# Patient Record
Sex: FEMALE | Race: WHITE | NOT HISPANIC OR LATINO | Employment: FULL TIME | URBAN - METROPOLITAN AREA
[De-identification: names, ages, dates, MRNs, and addresses within clinical notes are randomized per-mention and may not be internally consistent; named-entity substitution may affect disease eponyms.]

---

## 2023-05-21 DIAGNOSIS — Z00.00 ROUTINE GENERAL MEDICAL EXAMINATION AT A HEALTH CARE FACILITY: Primary | ICD-10-CM

## 2023-06-06 ENCOUNTER — LAB (OUTPATIENT)
Dept: LAB | Facility: LAB | Age: 58
End: 2023-06-06
Payer: COMMERCIAL

## 2023-06-06 DIAGNOSIS — Z00.00 ROUTINE GENERAL MEDICAL EXAMINATION AT A HEALTH CARE FACILITY: ICD-10-CM

## 2023-06-06 LAB
ALANINE AMINOTRANSFERASE (SGPT) (U/L) IN SER/PLAS: 19 U/L (ref 7–45)
ANION GAP IN SER/PLAS: 11 MMOL/L (ref 10–20)
CALCIDIOL (25 OH VITAMIN D3) (NG/ML) IN SER/PLAS: 31 NG/ML
CALCIUM (MG/DL) IN SER/PLAS: 9.9 MG/DL (ref 8.6–10.3)
CARBON DIOXIDE, TOTAL (MMOL/L) IN SER/PLAS: 31 MMOL/L (ref 21–32)
CHLORIDE (MMOL/L) IN SER/PLAS: 103 MMOL/L (ref 98–107)
CHOLESTEROL (MG/DL) IN SER/PLAS: 176 MG/DL (ref 0–199)
CHOLESTEROL IN HDL (MG/DL) IN SER/PLAS: 60.1 MG/DL
CHOLESTEROL/HDL RATIO: 2.9
COBALAMIN (VITAMIN B12) (PG/ML) IN SER/PLAS: 241 PG/ML (ref 211–911)
CREATININE (MG/DL) IN SER/PLAS: 0.93 MG/DL (ref 0.5–1.05)
ERYTHROCYTE DISTRIBUTION WIDTH (RATIO) BY AUTOMATED COUNT: 12.6 % (ref 11.5–14.5)
ERYTHROCYTE MEAN CORPUSCULAR HEMOGLOBIN CONCENTRATION (G/DL) BY AUTOMATED: 31.4 G/DL (ref 32–36)
ERYTHROCYTE MEAN CORPUSCULAR VOLUME (FL) BY AUTOMATED COUNT: 91 FL (ref 80–100)
ERYTHROCYTES (10*6/UL) IN BLOOD BY AUTOMATED COUNT: 4.46 X10E12/L (ref 4–5.2)
GFR FEMALE: 72 ML/MIN/1.73M2
GLUCOSE (MG/DL) IN SER/PLAS: 90 MG/DL (ref 74–99)
HEMATOCRIT (%) IN BLOOD BY AUTOMATED COUNT: 40.5 % (ref 36–46)
HEMOGLOBIN (G/DL) IN BLOOD: 12.7 G/DL (ref 12–16)
LDL: 88 MG/DL (ref 0–99)
LEUKOCYTES (10*3/UL) IN BLOOD BY AUTOMATED COUNT: 5.8 X10E9/L (ref 4.4–11.3)
PLATELETS (10*3/UL) IN BLOOD AUTOMATED COUNT: 222 X10E9/L (ref 150–450)
POTASSIUM (MMOL/L) IN SER/PLAS: 4.5 MMOL/L (ref 3.5–5.3)
SODIUM (MMOL/L) IN SER/PLAS: 140 MMOL/L (ref 136–145)
TRIGLYCERIDE (MG/DL) IN SER/PLAS: 140 MG/DL (ref 0–149)
UREA NITROGEN (MG/DL) IN SER/PLAS: 34 MG/DL (ref 6–23)
VLDL: 28 MG/DL (ref 0–40)

## 2023-06-06 PROCEDURE — 80061 LIPID PANEL: CPT

## 2023-06-06 PROCEDURE — 85027 COMPLETE CBC AUTOMATED: CPT

## 2023-06-06 PROCEDURE — 36415 COLL VENOUS BLD VENIPUNCTURE: CPT

## 2023-06-06 PROCEDURE — 82607 VITAMIN B-12: CPT

## 2023-06-06 PROCEDURE — 82306 VITAMIN D 25 HYDROXY: CPT

## 2023-06-06 PROCEDURE — 84460 ALANINE AMINO (ALT) (SGPT): CPT

## 2023-06-06 PROCEDURE — 80048 BASIC METABOLIC PNL TOTAL CA: CPT

## 2023-06-28 DIAGNOSIS — F41.9 ANXIETY: Primary | ICD-10-CM

## 2023-06-29 ENCOUNTER — OFFICE VISIT (OUTPATIENT)
Dept: PRIMARY CARE | Facility: CLINIC | Age: 58
End: 2023-06-29
Payer: COMMERCIAL

## 2023-06-29 VITALS
BODY MASS INDEX: 18.08 KG/M2 | WEIGHT: 115.2 LBS | DIASTOLIC BLOOD PRESSURE: 78 MMHG | SYSTOLIC BLOOD PRESSURE: 114 MMHG | HEIGHT: 67 IN

## 2023-06-29 DIAGNOSIS — G47.30 SLEEP APNEA, UNSPECIFIED TYPE: ICD-10-CM

## 2023-06-29 DIAGNOSIS — R53.83 OTHER FATIGUE: ICD-10-CM

## 2023-06-29 DIAGNOSIS — Z00.00 ROUTINE PHYSICAL EXAMINATION: Primary | ICD-10-CM

## 2023-06-29 DIAGNOSIS — E53.8 B12 DEFICIENCY: ICD-10-CM

## 2023-06-29 DIAGNOSIS — E55.9 VITAMIN D DEFICIENCY: ICD-10-CM

## 2023-06-29 DIAGNOSIS — D69.6 THROMBOCYTOPENIA (CMS-HCC): ICD-10-CM

## 2023-06-29 PROBLEM — F41.9 ANXIETY: Status: ACTIVE | Noted: 2023-06-29

## 2023-06-29 PROBLEM — N20.0 NEPHROLITHIASIS: Status: ACTIVE | Noted: 2023-06-29

## 2023-06-29 PROCEDURE — 1036F TOBACCO NON-USER: CPT | Performed by: INTERNAL MEDICINE

## 2023-06-29 PROCEDURE — 99396 PREV VISIT EST AGE 40-64: CPT | Performed by: INTERNAL MEDICINE

## 2023-06-29 RX ORDER — ACETAMINOPHEN 500 MG
TABLET ORAL DAILY
COMMUNITY

## 2023-06-29 RX ORDER — DULOXETIN HYDROCHLORIDE 30 MG/1
CAPSULE, DELAYED RELEASE ORAL
Qty: 270 CAPSULE | Refills: 3 | Status: SHIPPED | OUTPATIENT
Start: 2023-06-29 | End: 2023-08-30 | Stop reason: DRUGHIGH

## 2023-06-29 RX ORDER — CALCIUM CARBONATE/VITAMIN D3 500-10/5ML
400 LIQUID (ML) ORAL
COMMUNITY

## 2023-06-29 RX ORDER — DULOXETIN HYDROCHLORIDE 30 MG/1
3 CAPSULE, DELAYED RELEASE ORAL DAILY
COMMUNITY
Start: 2019-10-10 | End: 2023-12-05 | Stop reason: DRUGHIGH

## 2023-06-29 RX ORDER — SUMATRIPTAN SUCCINATE 100 MG/1
100 TABLET ORAL ONCE AS NEEDED
COMMUNITY
End: 2023-08-30 | Stop reason: SDUPTHER

## 2023-06-29 ASSESSMENT — PATIENT HEALTH QUESTIONNAIRE - PHQ9
1. LITTLE INTEREST OR PLEASURE IN DOING THINGS: SEVERAL DAYS
SUM OF ALL RESPONSES TO PHQ9 QUESTIONS 1 AND 2: 2
2. FEELING DOWN, DEPRESSED OR HOPELESS: SEVERAL DAYS

## 2023-06-29 NOTE — PROGRESS NOTES
"SUBJECTIVE:   Namrata Myers is a 58 y.o. female presenting for her annual checkup.  +exercise  Diet \"pretty good\"    Current Outpatient Medications   Medication Sig Dispense Refill    cetirizine (ZyrTEC) 10 mg capsule Take by mouth once daily.      cholecalciferol (Vitamin D-3) 50 mcg (2,000 unit) capsule Take by mouth early in the morning..      DULoxetine (Cymbalta) 30 mg DR capsule Take 3 capsules (90 mg) by mouth once daily.      magnesium oxide 400 mg magnesium capsule Take 1 capsule (400 mg) by mouth once daily.      SUMAtriptan (Imitrex) 100 mg tablet Take 1 tablet (100 mg) by mouth 1 time if needed.       No current facility-administered medications for this visit.     Allergies: Codeine and Pollen extracts     ROS:  Feeling well. No dyspnea or chest pain on exertion. No abdominal pain, change in bowel habits, black or bloody stools. No urinary tract or prostatic symptoms. No neurological complaints.    OBJECTIVE:   The patient appears well, alert, oriented x 3, in no distress.   /78   Ht 1.689 m (5' 6.5\")   Wt 52.3 kg (115 lb 3.2 oz)   BMI 18.32 kg/m²   ENT normal.  Neck supple. No adenopathy or thyromegaly. LUIZ. Lungs are clear, good air entry, no wheezes, rhonchi or rales. S1 and S2 normal, no murmurs, regular rate and rhythm. Abdomen is soft without tenderness, guarding, mass or organomegaly.  exam:  Extremities show no edema, normal peripheral pulses. Neurological is normal without focal findings.    Lab Results   Component Value Date    WBC 5.8 06/06/2023    HGB 12.7 06/06/2023    HCT 40.5 06/06/2023     06/06/2023    CHOL 176 06/06/2023    TRIG 140 06/06/2023    HDL 60.1 06/06/2023    ALT 19 06/06/2023    AST 23 05/28/2019     06/06/2023    K 4.5 06/06/2023     06/06/2023    CREATININE 0.93 06/06/2023    BUN 34 (H) 06/06/2023    CO2 31 06/06/2023    TSH 2.47 08/06/2021       ASSESSMENT:   healthy adult male    PLAN:   return for routine annual checkups     #1 " anxiety/depression- reviewed.  Continue treatment.  #2 fatigue-low-grade.  We will start with basic labs as well as sleep study given significant snoring.  Further evaluation pending  #3 g84-tgenyi B12, low end of normal.  We will begin 1000 mcg OTC daily.  #4 vit D-Labs  #5 thrombocytopenia-clinically stable.  Recheck CBC every 6 months.  Status post heme evaluation  #6 kidney stones/hematuria- good.  f/u  w/     Suspect RAMEZ-snoring, increased fatigue.  Occasional morning headaches.  We will check sleep study.    f/u  2025

## 2023-08-26 ENCOUNTER — LAB (OUTPATIENT)
Dept: LAB | Facility: LAB | Age: 58
End: 2023-08-26
Payer: COMMERCIAL

## 2023-08-26 DIAGNOSIS — D69.6 THROMBOCYTOPENIA (CMS-HCC): ICD-10-CM

## 2023-08-26 DIAGNOSIS — R53.83 OTHER FATIGUE: ICD-10-CM

## 2023-08-26 DIAGNOSIS — E53.8 B12 DEFICIENCY: ICD-10-CM

## 2023-08-26 LAB
ANION GAP IN SER/PLAS: 11 MMOL/L (ref 10–20)
APPEARANCE, URINE: ABNORMAL
BILIRUBIN, URINE: NEGATIVE
BLOOD, URINE: ABNORMAL
CALCIUM (MG/DL) IN SER/PLAS: 10 MG/DL (ref 8.6–10.6)
CALCIUM OXALATE CRYSTALS, URINE: ABNORMAL /HPF
CARBON DIOXIDE, TOTAL (MMOL/L) IN SER/PLAS: 31 MMOL/L (ref 21–32)
CHLORIDE (MMOL/L) IN SER/PLAS: 104 MMOL/L (ref 98–107)
COBALAMIN (VITAMIN B12) (PG/ML) IN SER/PLAS: 751 PG/ML (ref 211–911)
COLOR, URINE: YELLOW
CREATININE (MG/DL) IN SER/PLAS: 0.81 MG/DL (ref 0.5–1.05)
ERYTHROCYTE DISTRIBUTION WIDTH (RATIO) BY AUTOMATED COUNT: 12.3 % (ref 11.5–14.5)
ERYTHROCYTE MEAN CORPUSCULAR HEMOGLOBIN CONCENTRATION (G/DL) BY AUTOMATED: 31.2 G/DL (ref 32–36)
ERYTHROCYTE MEAN CORPUSCULAR VOLUME (FL) BY AUTOMATED COUNT: 93 FL (ref 80–100)
ERYTHROCYTES (10*6/UL) IN BLOOD BY AUTOMATED COUNT: 4.44 X10E12/L (ref 4–5.2)
GFR FEMALE: 85 ML/MIN/1.73M2
GLUCOSE (MG/DL) IN SER/PLAS: 72 MG/DL (ref 74–99)
GLUCOSE, URINE: NEGATIVE MG/DL
HEMATOCRIT (%) IN BLOOD BY AUTOMATED COUNT: 41.4 % (ref 36–46)
HEMOGLOBIN (G/DL) IN BLOOD: 12.9 G/DL (ref 12–16)
HYALINE CASTS, URINE: ABNORMAL /LPF
KETONES, URINE: NEGATIVE MG/DL
LEUKOCYTE ESTERASE, URINE: ABNORMAL
LEUKOCYTES (10*3/UL) IN BLOOD BY AUTOMATED COUNT: 6.5 X10E9/L (ref 4.4–11.3)
MUCUS, URINE: ABNORMAL /LPF
NITRITE, URINE: NEGATIVE
NRBC (PER 100 WBCS) BY AUTOMATED COUNT: 0 /100 WBC (ref 0–0)
PH, URINE: 5 (ref 5–8)
PLATELETS (10*3/UL) IN BLOOD AUTOMATED COUNT: 187 X10E9/L (ref 150–450)
POTASSIUM (MMOL/L) IN SER/PLAS: 4.3 MMOL/L (ref 3.5–5.3)
PROTEIN, URINE: NEGATIVE MG/DL
RBC, URINE: 11 /HPF (ref 0–5)
SODIUM (MMOL/L) IN SER/PLAS: 142 MMOL/L (ref 136–145)
SPECIFIC GRAVITY, URINE: 1.02 (ref 1–1.03)
SQUAMOUS EPITHELIAL CELLS, URINE: <1 /HPF
THYROTROPIN (MIU/L) IN SER/PLAS BY DETECTION LIMIT <= 0.05 MIU/L: 1.75 MIU/L (ref 0.44–3.98)
UREA NITROGEN (MG/DL) IN SER/PLAS: 30 MG/DL (ref 6–23)
UROBILINOGEN, URINE: <2 MG/DL (ref 0–1.9)
WBC, URINE: 4 /HPF (ref 0–5)

## 2023-08-26 PROCEDURE — 85027 COMPLETE CBC AUTOMATED: CPT

## 2023-08-26 PROCEDURE — 36415 COLL VENOUS BLD VENIPUNCTURE: CPT

## 2023-08-26 PROCEDURE — 84443 ASSAY THYROID STIM HORMONE: CPT

## 2023-08-26 PROCEDURE — 80048 BASIC METABOLIC PNL TOTAL CA: CPT

## 2023-08-26 PROCEDURE — 82607 VITAMIN B-12: CPT

## 2023-08-30 ENCOUNTER — OFFICE VISIT (OUTPATIENT)
Dept: PRIMARY CARE | Facility: CLINIC | Age: 58
End: 2023-08-30
Payer: COMMERCIAL

## 2023-08-30 VITALS
DIASTOLIC BLOOD PRESSURE: 74 MMHG | TEMPERATURE: 97.5 F | WEIGHT: 118 LBS | BODY MASS INDEX: 18.76 KG/M2 | SYSTOLIC BLOOD PRESSURE: 110 MMHG

## 2023-08-30 DIAGNOSIS — G43.819 OTHER MIGRAINE WITHOUT STATUS MIGRAINOSUS, INTRACTABLE: ICD-10-CM

## 2023-08-30 DIAGNOSIS — F41.9 ANXIETY: Primary | ICD-10-CM

## 2023-08-30 DIAGNOSIS — D69.6 THROMBOCYTOPENIA (CMS-HCC): ICD-10-CM

## 2023-08-30 DIAGNOSIS — N30.00 ACUTE CYSTITIS WITHOUT HEMATURIA: ICD-10-CM

## 2023-08-30 LAB
APPEARANCE, URINE: ABNORMAL
BILIRUBIN, URINE: NEGATIVE
BLOOD, URINE: ABNORMAL
CALCIUM OXALATE CRYSTALS, URINE: ABNORMAL /HPF
COLOR, URINE: YELLOW
GLUCOSE, URINE: NEGATIVE MG/DL
KETONES, URINE: NEGATIVE MG/DL
LEUKOCYTE ESTERASE, URINE: NEGATIVE
MUCUS, URINE: ABNORMAL /LPF
NITRITE, URINE: NEGATIVE
PH, URINE: 5 (ref 5–8)
PROTEIN, URINE: NEGATIVE MG/DL
RBC, URINE: 6 /HPF (ref 0–5)
SPECIFIC GRAVITY, URINE: 1.02 (ref 1–1.03)
SQUAMOUS EPITHELIAL CELLS, URINE: 1 /HPF
UROBILINOGEN, URINE: <2 MG/DL (ref 0–1.9)
WBC, URINE: 3 /HPF (ref 0–5)

## 2023-08-30 PROCEDURE — 99214 OFFICE O/P EST MOD 30 MIN: CPT | Performed by: INTERNAL MEDICINE

## 2023-08-30 PROCEDURE — 81001 URINALYSIS AUTO W/SCOPE: CPT

## 2023-08-30 PROCEDURE — 1036F TOBACCO NON-USER: CPT | Performed by: INTERNAL MEDICINE

## 2023-08-30 RX ORDER — SUMATRIPTAN SUCCINATE 100 MG/1
100 TABLET ORAL ONCE AS NEEDED
Qty: 9 TABLET | Refills: 3 | Status: SHIPPED | OUTPATIENT
Start: 2023-08-30 | End: 2023-10-29

## 2023-08-30 RX ORDER — LANOLIN ALCOHOL/MO/W.PET/CERES
100 CREAM (GRAM) TOPICAL DAILY
COMMUNITY

## 2023-08-30 RX ORDER — DULOXETIN HYDROCHLORIDE 60 MG/1
60 CAPSULE, DELAYED RELEASE ORAL DAILY
Qty: 90 CAPSULE | Refills: 3 | Status: SHIPPED | OUTPATIENT
Start: 2023-08-30 | End: 2023-12-05 | Stop reason: DRUGHIGH

## 2023-08-30 RX ORDER — DULOXETIN HYDROCHLORIDE 60 MG/1
60 CAPSULE, DELAYED RELEASE ORAL DAILY
Qty: 30 CAPSULE | Refills: 5 | Status: SHIPPED | OUTPATIENT
Start: 2023-08-30 | End: 2023-08-30 | Stop reason: SDUPTHER

## 2023-08-30 ASSESSMENT — PATIENT HEALTH QUESTIONNAIRE - PHQ9
1. LITTLE INTEREST OR PLEASURE IN DOING THINGS: NOT AT ALL
2. FEELING DOWN, DEPRESSED OR HOPELESS: NOT AT ALL
SUM OF ALL RESPONSES TO PHQ9 QUESTIONS 1 AND 2: 0

## 2023-08-30 NOTE — PROGRESS NOTES
"SUBJECTIVE:   Namrata Myers is a 58 y.o. female presenting for f/u    +exercise  Diet \"pretty good\"    Current Outpatient Medications   Medication Sig Dispense Refill    cetirizine (ZyrTEC) 10 mg capsule Take by mouth once daily.      cholecalciferol (Vitamin D-3) 50 mcg (2,000 unit) capsule Take by mouth early in the morning..      cyanocobalamin (Vitamin B-12) 1,000 mcg tablet Take 100 mcg by mouth once daily.      DULoxetine (Cymbalta) 30 mg DR capsule TAKE 3 CAPSULES DAILY (Patient taking differently: 2 times a day.) 270 capsule 3    magnesium oxide 400 mg magnesium capsule Take 1 capsule (400 mg) by mouth once daily.      SUMAtriptan (Imitrex) 100 mg tablet Take 1 tablet (100 mg) by mouth 1 time if needed.      DULoxetine (Cymbalta) 30 mg DR capsule Take 3 capsules (90 mg) by mouth once daily.       No current facility-administered medications for this visit.     Allergies: Codeine and Pollen extracts     ROS:  Feeling well. No dyspnea or chest pain on exertion. No abdominal pain, change in bowel habits, black or bloody stools. No urinary tract or prostatic symptoms. No neurological complaints.    OBJECTIVE:   The patient appears well, alert, oriented x 3, in no distress.   /74   Temp 36.4 °C (97.5 °F)   Wt 53.5 kg (118 lb)   BMI 18.76 kg/m²   ENT normal.  Neck supple. No adenopathy or thyromegaly. LUIZ. Lungs are clear, good air entry, no wheezes, rhonchi or rales. S1 and S2 normal, no murmurs, regular rate and rhythm. Abdomen is soft without tenderness, guarding, mass or organomegaly.  exam:  Extremities show no edema, normal peripheral pulses. Neurological is normal without focal findings.    Lab Results   Component Value Date    WBC 6.5 08/26/2023    HGB 12.9 08/26/2023    HCT 41.4 08/26/2023     08/26/2023    CHOL 176 06/06/2023    TRIG 140 06/06/2023    HDL 60.1 06/06/2023    ALT 19 06/06/2023    AST 23 05/28/2019     08/26/2023    K 4.3 08/26/2023     08/26/2023    " CREATININE 0.81 08/26/2023    BUN 30 (H) 08/26/2023    CO2 31 08/26/2023    TSH 1.75 08/26/2023       ASSESSMENT/PLAN:   #1 anxiety/depression- reviewed, better w/ increased rx.  Continue treatment.  #2 fatigue-low-grade.  better  #3 b12-con't  B12, low end of normal.   1000 mcg OTC daily.  #4 vit D-Labs  #5 thrombocytopenia-clinically stable.  Recheck CBC every 6 months.  Status post heme evaluation  #6 kidney stones/hematuria- good.  f/u  w/   #7 ? Uti- UA/Ucx.  f/u  appt pending w/

## 2023-11-06 ENCOUNTER — OFFICE VISIT (OUTPATIENT)
Dept: UROLOGY | Facility: CLINIC | Age: 58
End: 2023-11-06
Payer: COMMERCIAL

## 2023-11-06 VITALS — DIASTOLIC BLOOD PRESSURE: 78 MMHG | SYSTOLIC BLOOD PRESSURE: 119 MMHG

## 2023-11-06 DIAGNOSIS — N20.0 NEPHROLITHIASIS: ICD-10-CM

## 2023-11-06 DIAGNOSIS — R31.21 ASYMPTOMATIC MICROSCOPIC HEMATURIA: Primary | ICD-10-CM

## 2023-11-06 LAB
POC APPEARANCE, URINE: CLEAR
POC BILIRUBIN, URINE: NEGATIVE
POC BLOOD, URINE: NEGATIVE
POC COLOR, URINE: YELLOW
POC GLUCOSE, URINE: NEGATIVE MG/DL
POC KETONES, URINE: NEGATIVE MG/DL
POC LEUKOCYTES, URINE: ABNORMAL
POC NITRITE,URINE: NEGATIVE
POC PH, URINE: 5.5 PH
POC PROTEIN, URINE: NEGATIVE MG/DL
POC SPECIFIC GRAVITY, URINE: >=1.03
POC UROBILINOGEN, URINE: 0.2 EU/DL

## 2023-11-06 PROCEDURE — 81003 URINALYSIS AUTO W/O SCOPE: CPT | Performed by: STUDENT IN AN ORGANIZED HEALTH CARE EDUCATION/TRAINING PROGRAM

## 2023-11-06 PROCEDURE — 1036F TOBACCO NON-USER: CPT | Performed by: STUDENT IN AN ORGANIZED HEALTH CARE EDUCATION/TRAINING PROGRAM

## 2023-11-06 PROCEDURE — 99214 OFFICE O/P EST MOD 30 MIN: CPT | Performed by: STUDENT IN AN ORGANIZED HEALTH CARE EDUCATION/TRAINING PROGRAM

## 2023-11-06 NOTE — PROGRESS NOTES
Namrata presents for annual FU and KUB results.  The patient’s EMR has been reviewed.  Lives in [place], [place].  H/o microscopic hematuria, and BL nephrolithiasis.   Previous hematuria work-up negative x2. (2019, 2022)  Cytology each time showed atypical cells only.    For her BL nephrolithiasis.   She is s/p right ESWL (08/05/22).  Good response, stones fragmented well.   Recent KUB negative for evident stones on left but overlying bowel present, right lower pole stone fragment now seen much improved from 7mm to 3mm following ESWL (08/07/23)    SUBJECTIVE: HPI   TODAY (11/06/23)  Presents today for annual FU and latest KUB results.  Personally reviewed her latest KUB (08/07/23).   Showed no discernible stone BL.   Findings shared with patient.   IO urinalysis negative for blood.   Continues to deny any stone-related symptoms.   Denies any recent UTIs, gross hematuria, flank pain, fevers or chills.     TO REVIEW: Last evaluation (09/15/22)  Pre-op KUB results showed:  - 7 mm stone of RLP (RIGHT)  - 2x small stones on the LEFT, 3mm and 1-2mm seen.    Now s/p R ESWL 8/5/22     Latest KUB personally reviewed:   -No obvious right renal stones noted  - Left sided stones are difficult to see on this study, were punctate      To review:  CTU personally reviewed: she has 6-7mm lower pole stones.  Slightly increased in size; no hydronephrosis, no filling defect, no renal tumors.     PMH/PSH/Family/Social history all reviewed and unchanged  She is a   Nonsmoker  No family history of  malignancy     Past medical, surgical, family and social history in the chart was reviewed and is accurate including any additions to what is in this HPI.    Review of Systems   Constitutional: denies any unintentional weight loss or change in strength.  Integumentary: denies any rashes or pruritus.  Eyes: denies any double vision or eye pain.  Ear/Nose/Mouth/Throat: denies any nosebleeds or gum bleeds.  Cardiovascular:  denies any chest pain or syncope.  Respiratory: denies hemoptysis.  Gastrointestinal: denies nausea or vomiting.  Musculoskeletal: denies muscle cramping or weakness.  Neurologic: denies convulsions or seizures.  Hematologic/Lymphatic: denies bleeding tendencies.  Endocrine: denies heat/cold intolerance.  All other systems have been reviewed and are negative unless otherwise noted in the HPI.    OBJECTIVE:  Visit Vitals  /78 (BP Location: Right arm)     Physical Exam   Constitutional: No obvious distress.  Eyes: Non-injected conjunctiva, sclera clear, EOMI.  Ears/Nose/Mouth/Throat: No obvious drainage per ears or nose.  Cardiovascular: Extremities are warm and well perfused. No edema, cyanosis or pallor.  Respiratory: No audible wheezing/stridor; respirations do not appear labored.  Gastrointestinal: Abdomen soft, not distended.  Musculoskeletal: Normal ROM of extremities.  Skin: No obvious rashes or open sores.  Neurologic: Alert and oriented, CN 2-12 grossly intact.  Psychiatric: Answers questions appropriately with normal affect.  Hematologic/Lymphatic/Immunologic: No obvious bruises or sites of spontaneous bleeding.  Genitourinary: No CVA tenderness, bladder not palpable.     Labs and imaging:  Lab Results   Component Value Date    WBC 6.5 08/26/2023    HGB 12.9 08/26/2023    HCT 41.4 08/26/2023     08/26/2023    CHOL 176 06/06/2023    TRIG 140 06/06/2023    HDL 60.1 06/06/2023    ALT 19 06/06/2023    AST 23 05/28/2019     08/26/2023    K 4.3 08/26/2023     08/26/2023    CREATININE 0.81 08/26/2023    BUN 30 (H) 08/26/2023    CO2 31 08/26/2023    TSH 1.75 08/26/2023    INR 1.0 07/19/2022     ASSESSMENT:  Problem List Items Addressed This Visit       Nephrolithiasis    Relevant Orders    POCT UA Automated manually resulted (Completed)    XR abdomen 1 view    Follow Up In Urology    Asymptomatic microscopic hematuria - Primary      PLAN:  Had a good response to ESWL in the past.   Plan to  repeat KUB in 4-6 months.   If left stones visible, she is interested in contralateral ESWL.   RTC in ~May 2024 with a repeat KUB prior to.     Patient was educated on stone prevention dietary modifications which include increased water intake, citric acid supplementation in the form of lemon juice, low oxalate diet, moderate protein intake and low sodium intake. In addition, suggested avoiding dark-colored sodas. A daily urine output of 2.5 L is also recommended if feasible.     All questions were answered to the patient’s satisfaction.  Patient agrees with the plan and wishes to proceed.    Scribed for Dr. Jeet Newman by Shawn Waller.  I, Dr. Jeet Newman have personally reviewed and agreed with the information entered by the Virtual Scribe. 11/06/23.

## 2023-11-25 ENCOUNTER — LAB (OUTPATIENT)
Dept: LAB | Facility: LAB | Age: 58
End: 2023-11-25
Payer: COMMERCIAL

## 2023-11-25 DIAGNOSIS — D69.6 THROMBOCYTOPENIA (CMS-HCC): ICD-10-CM

## 2023-11-25 DIAGNOSIS — N30.00 ACUTE CYSTITIS WITHOUT HEMATURIA: ICD-10-CM

## 2023-11-25 PROCEDURE — 80048 BASIC METABOLIC PNL TOTAL CA: CPT

## 2023-11-25 PROCEDURE — 36415 COLL VENOUS BLD VENIPUNCTURE: CPT

## 2023-11-25 PROCEDURE — 85027 COMPLETE CBC AUTOMATED: CPT

## 2023-11-26 LAB
ANION GAP SERPL CALC-SCNC: 12 MMOL/L (ref 10–20)
BUN SERPL-MCNC: 21 MG/DL (ref 6–23)
CALCIUM SERPL-MCNC: 9.9 MG/DL (ref 8.6–10.6)
CHLORIDE SERPL-SCNC: 101 MMOL/L (ref 98–107)
CO2 SERPL-SCNC: 31 MMOL/L (ref 21–32)
CREAT SERPL-MCNC: 0.89 MG/DL (ref 0.5–1.05)
ERYTHROCYTE [DISTWIDTH] IN BLOOD BY AUTOMATED COUNT: 12.6 % (ref 11.5–14.5)
GFR SERPL CREATININE-BSD FRML MDRD: 76 ML/MIN/1.73M*2
GLUCOSE SERPL-MCNC: 94 MG/DL (ref 74–99)
HCT VFR BLD AUTO: 42.1 % (ref 36–46)
HGB BLD-MCNC: 13.5 G/DL (ref 12–16)
MCH RBC QN AUTO: 29.5 PG (ref 26–34)
MCHC RBC AUTO-ENTMCNC: 32.1 G/DL (ref 32–36)
MCV RBC AUTO: 92 FL (ref 80–100)
NRBC BLD-RTO: 0 /100 WBCS (ref 0–0)
PLATELET # BLD AUTO: 173 X10*3/UL (ref 150–450)
POTASSIUM SERPL-SCNC: 4.1 MMOL/L (ref 3.5–5.3)
RBC # BLD AUTO: 4.57 X10*6/UL (ref 4–5.2)
SODIUM SERPL-SCNC: 140 MMOL/L (ref 136–145)
WBC # BLD AUTO: 6.5 X10*3/UL (ref 4.4–11.3)

## 2023-11-28 DIAGNOSIS — D69.6 THROMBOCYTOPENIA (CMS-HCC): ICD-10-CM

## 2023-11-28 DIAGNOSIS — F41.9 ANXIETY: Primary | ICD-10-CM

## 2023-12-05 RX ORDER — DULOXETIN HYDROCHLORIDE 30 MG/1
90 CAPSULE, DELAYED RELEASE ORAL DAILY
Qty: 270 CAPSULE | Refills: 3 | Status: SHIPPED | OUTPATIENT
Start: 2023-12-05 | End: 2024-12-04

## 2024-03-28 ENCOUNTER — LAB (OUTPATIENT)
Dept: LAB | Facility: LAB | Age: 59
End: 2024-03-28
Payer: COMMERCIAL

## 2024-03-28 DIAGNOSIS — D69.6 THROMBOCYTOPENIA (CMS-HCC): ICD-10-CM

## 2024-03-28 LAB
ERYTHROCYTE [DISTWIDTH] IN BLOOD BY AUTOMATED COUNT: 12.9 % (ref 11.5–14.5)
HCT VFR BLD AUTO: 40.1 % (ref 36–46)
HGB BLD-MCNC: 12.7 G/DL (ref 12–16)
MCH RBC QN AUTO: 29.4 PG (ref 26–34)
MCHC RBC AUTO-ENTMCNC: 31.7 G/DL (ref 32–36)
MCV RBC AUTO: 93 FL (ref 80–100)
NRBC BLD-RTO: 0 /100 WBCS (ref 0–0)
PLATELET # BLD AUTO: 239 X10*3/UL (ref 150–450)
RBC # BLD AUTO: 4.32 X10*6/UL (ref 4–5.2)
VIT B12 SERPL-MCNC: 948 PG/ML (ref 211–911)
WBC # BLD AUTO: 5.4 X10*3/UL (ref 4.4–11.3)

## 2024-03-28 PROCEDURE — 82607 VITAMIN B-12: CPT

## 2024-03-28 PROCEDURE — 85027 COMPLETE CBC AUTOMATED: CPT

## 2024-03-28 PROCEDURE — 36415 COLL VENOUS BLD VENIPUNCTURE: CPT

## 2024-03-29 ENCOUNTER — HOSPITAL ENCOUNTER (OUTPATIENT)
Dept: RADIOLOGY | Facility: CLINIC | Age: 59
Discharge: HOME | End: 2024-03-29
Payer: COMMERCIAL

## 2024-03-29 DIAGNOSIS — N20.0 NEPHROLITHIASIS: ICD-10-CM

## 2024-03-29 PROCEDURE — 74018 RADEX ABDOMEN 1 VIEW: CPT | Performed by: RADIOLOGY

## 2024-03-29 PROCEDURE — 74018 RADEX ABDOMEN 1 VIEW: CPT

## 2024-05-06 ENCOUNTER — TELEMEDICINE (OUTPATIENT)
Dept: UROLOGY | Facility: CLINIC | Age: 59
End: 2024-05-06
Payer: COMMERCIAL

## 2024-05-06 DIAGNOSIS — N20.0 NEPHROLITHIASIS: Primary | ICD-10-CM

## 2024-05-06 PROCEDURE — 99213 OFFICE O/P EST LOW 20 MIN: CPT | Performed by: STUDENT IN AN ORGANIZED HEALTH CARE EDUCATION/TRAINING PROGRAM

## 2024-05-06 RX ORDER — BUPROPION HYDROCHLORIDE 150 MG/1
150 TABLET ORAL DAILY
COMMUNITY

## 2024-05-06 NOTE — PROGRESS NOTES
Scribed for Dr. Jeet Newman by Shawn Waller. I, Dr. Jeet Newman have personally reviewed and agreed with the information entered by the Virtual Scribe. 05/06/24.    ASSESSMENT:  Problem List Items Addressed This Visit       Nephrolithiasis - Primary    Relevant Orders    XR abdomen 1 view      PLAN:  #Nephrolithiasis  Good response to RIGHT ESWL in the past. (Aug 2022)  Latest KUB (03/29/24) showed no stones within right kidney.   Still with a small non-obstructing LLP stone. (3-4 mm)     Discussed options including continued surveillance vs left ESWL.   Opts to proceed with surveillance only;  Currently with no stone-related symptoms.   Follow up in 1 year with a KUB prior to.     We discussed that most calculi under 5 mm can be safely observed. This recommendation is based on large series looking at spontaneous passage rates that suggest that the likelihood of stone passage is highest for stones under 4 mm size (approximately 70-80%), moderate for stones between 4 and 6 mm (50%), and lowest for stones 6 mm or greater (20-30%).    I discussed with the patient the management options for kidney and ureteral stones, including observation, medical expulsive therapy, stent placement, rigid or flexible ureteroscopy with stone basket or laser stone fragmentation, ESWL; as well as advanced options for larger stones such as percutaneous and surgical approaches.    Patient was educated on stone prevention dietary modifications which include increased water intake, citric acid supplementation in the form of lemon juice, low oxalate diet, moderate protein intake and low sodium intake. In addition, suggested avoiding dark-colored sodas. A daily urine output of 2.5 L is also recommended if feasible.     All questions were answered to the patient’s satisfaction.  Patient agrees with the plan and wishes to proceed.  Continue follow-up for ongoing care of her chronic medical conditions.       History of Present Illness  (HPI):  Namrata presents virtually for a follow up visit.  The patient’s EMR has been reviewed.  Lives in [place], [place].    Hx of microscopic hematuria, and BL nephrolithiasis.   Previous hematuria work-up negative x2. (2019, 2022)  Cytology each time showed atypical cells only.     For her bilateral nephrolithiasis.   Previously had a 7 mm right sided stone.   Underwent RIGHT ESWL with me for this. (08/05/22)  Had a good response, stones fragmented well.   Right sided stones no longer visualized on latest KUB.   However, still with a small non-obstructing LLP stone. (3-4 mm)    TODAY: (05/06/24)  Presents virtually for follow up and KUB results.   Reports she has been doing well overall.   Denies any stone-related symptoms.   Denies any recent infections, fevers or chills.   Latest KUB results reviewed with patient.     KUB (03/29/24) personally reviewed.   Showed a small 3-4 mm LLP stone.   No stones visualized within the right kidney.     TO REVIEW: Last visit (11/06/23)  Presents today for annual FU and latest KUB results.  Personally reviewed her latest KUB (08/07/23).   Showed no discernible stone BL.   Findings shared with patient.   IO urinalysis negative for blood.   Continues to deny any stone-related symptoms.   Denies any recent UTIs, gross hematuria, flank pain, fevers or chills.     KUB (08/02/23) RLP stone fragment much improved;  - from 7mm to 3mm following ESWL (08/07/23)  - No evident stones on the left, but with overlying bowel present.     TO REVIEW: (09/15/22)  Pre-op KUB results showed:  - 7 mm stone of RLP (RIGHT)  - 2x small stones on the LEFT, 3mm and 1-2mm seen.     Now s/p R ESWL 8/5/22     Post-op KUB personally reviewed:   - No obvious right renal stones noted  - Left sided stones are difficult to see on this study, were punctate      TO REVIEW:  CTU personally reviewed: she has 6-7mm lower pole stones.  Slightly increased in size; no hydronephrosis;  No filling defect, no renal tumors.      PMH/PSH/Family/Social history all reviewed and unchanged  She is a   Nonsmoker  No family history of  malignancy    Past Medical History:   Diagnosis Date    Allergic     Headache      Past Surgical History:   Procedure Laterality Date    RHINOPLASTY  07/14/2015    Rhinoplasty     Family History   Problem Relation Name Age of Onset    Dementia Mother      Marfan syndrome Father      Aortic aneurysm Father      Hypertension Father      Diabetes Father      Colon cancer Paternal Grandfather       Social History     Tobacco Use   Smoking Status Never   Smokeless Tobacco Never     Current Outpatient Medications   Medication Sig Dispense Refill    buPROPion XL (Wellbutrin XL) 150 mg 24 hr tablet Take 1 tablet (150 mg) by mouth once daily. Do not crush, chew, or split.      cetirizine (ZyrTEC) 10 mg capsule Take by mouth once daily.      cholecalciferol (Vitamin D-3) 50 mcg (2,000 unit) capsule Take by mouth early in the morning..      cyanocobalamin (Vitamin B-12) 1,000 mcg tablet Take 100 mcg by mouth once daily.      DULoxetine (Cymbalta) 30 mg DR capsule Take 3 capsules (90 mg) by mouth once daily. 270 capsule 3    magnesium oxide 400 mg magnesium capsule Take 1 capsule (400 mg) by mouth once daily.      SUMAtriptan (Imitrex) 100 mg tablet Take 1 tablet (100 mg) by mouth 1 time if needed for migraine. 9 tablet 3     No current facility-administered medications for this visit.     Allergies   Allergen Reactions    Codeine Diarrhea, Nausea And Vomiting, Nausea Only, Other and Rash     nausea    Pollen Extracts Hives     Sneezing, sinus, watery eyes     Past medical, surgical, family and social history in the chart was reviewed and is accurate including any additions to what is in this HPI.    REVIEW OF SYSTEMS (ROS):   Constitutional: denies any unintentional weight loss or change in strength.  Integumentary: denies any rashes or pruritus.  Eyes: denies any double vision or eye  pain.  Ear/Nose/Mouth/Throat: denies any nosebleeds or gum bleeds.  Cardiovascular: denies any chest pain or syncope.  Respiratory: denies hemoptysis.  Gastrointestinal: denies nausea or vomiting.  Musculoskeletal: denies muscle cramping or weakness.  Neurologic: denies convulsions or seizures.  Hematologic/Lymphatic: denies bleeding tendencies.  Endocrine: denies heat/cold intolerance.  All other systems have been reviewed and are negative unless otherwise noted in the HPI.     OBJECTIVE:  There were no vitals taken for this visit.  PHYSICAL EXAM:  UNABLE TO ASSESS ON TELEHEALTH VISIT    LABS & IMAGING:  Lab Results   Component Value Date    WBC 5.4 03/28/2024    HGB 12.7 03/28/2024    HCT 40.1 03/28/2024     03/28/2024    CHOL 176 06/06/2023    TRIG 140 06/06/2023    HDL 60.1 06/06/2023    ALT 19 06/06/2023    AST 23 05/28/2019     11/25/2023    K 4.1 11/25/2023     11/25/2023    CREATININE 0.89 11/25/2023    BUN 21 11/25/2023    CO2 31 11/25/2023    TSH 1.75 08/26/2023    INR 1.0 07/19/2022     Scribed for Dr. Jeet Newman by Shawn Waller.  I, Dr. Jeet Newman have personally reviewed and agreed with the information entered by the Virtual Scribe. 05/06/24.

## 2024-07-21 DIAGNOSIS — Z00.00 WELL ADULT EXAM: Primary | ICD-10-CM

## 2024-08-07 ENCOUNTER — LAB (OUTPATIENT)
Dept: LAB | Facility: LAB | Age: 59
End: 2024-08-07
Payer: COMMERCIAL

## 2024-08-07 DIAGNOSIS — Z00.00 WELL ADULT EXAM: ICD-10-CM

## 2024-08-07 LAB
ALT SERPL W P-5'-P-CCNC: 15 U/L (ref 7–45)
ANION GAP SERPL CALC-SCNC: 11 MMOL/L (ref 10–20)
BUN SERPL-MCNC: 29 MG/DL (ref 6–23)
CALCIUM SERPL-MCNC: 9.9 MG/DL (ref 8.6–10.3)
CHLORIDE SERPL-SCNC: 102 MMOL/L (ref 98–107)
CHOLEST SERPL-MCNC: 200 MG/DL (ref 0–199)
CHOLESTEROL/HDL RATIO: 3
CO2 SERPL-SCNC: 31 MMOL/L (ref 21–32)
CREAT SERPL-MCNC: 1.13 MG/DL (ref 0.5–1.05)
EGFRCR SERPLBLD CKD-EPI 2021: 57 ML/MIN/1.73M*2
ERYTHROCYTE [DISTWIDTH] IN BLOOD BY AUTOMATED COUNT: 12.5 % (ref 11.5–14.5)
EST. AVERAGE GLUCOSE BLD GHB EST-MCNC: 117 MG/DL
GLUCOSE SERPL-MCNC: 69 MG/DL (ref 74–99)
HBA1C MFR BLD: 5.7 %
HCT VFR BLD AUTO: 40.5 % (ref 36–46)
HDLC SERPL-MCNC: 67 MG/DL
HGB BLD-MCNC: 12.9 G/DL (ref 12–16)
LDLC SERPL CALC-MCNC: 105 MG/DL
MCH RBC QN AUTO: 29.5 PG (ref 26–34)
MCHC RBC AUTO-ENTMCNC: 31.9 G/DL (ref 32–36)
MCV RBC AUTO: 93 FL (ref 80–100)
NON HDL CHOLESTEROL: 133 MG/DL (ref 0–149)
NRBC BLD-RTO: 0 /100 WBCS (ref 0–0)
PLATELET # BLD AUTO: 229 X10*3/UL (ref 150–450)
POTASSIUM SERPL-SCNC: 4.1 MMOL/L (ref 3.5–5.3)
RBC # BLD AUTO: 4.38 X10*6/UL (ref 4–5.2)
SODIUM SERPL-SCNC: 140 MMOL/L (ref 136–145)
TRIGL SERPL-MCNC: 142 MG/DL (ref 0–149)
TSH SERPL-ACNC: 3.36 MIU/L (ref 0.44–3.98)
VLDL: 28 MG/DL (ref 0–40)
WBC # BLD AUTO: 6.1 X10*3/UL (ref 4.4–11.3)

## 2024-08-07 PROCEDURE — 36415 COLL VENOUS BLD VENIPUNCTURE: CPT

## 2024-08-07 PROCEDURE — 85027 COMPLETE CBC AUTOMATED: CPT

## 2024-08-07 PROCEDURE — 84443 ASSAY THYROID STIM HORMONE: CPT

## 2024-08-07 PROCEDURE — 84460 ALANINE AMINO (ALT) (SGPT): CPT

## 2024-08-07 PROCEDURE — 80048 BASIC METABOLIC PNL TOTAL CA: CPT

## 2024-08-07 PROCEDURE — 80061 LIPID PANEL: CPT

## 2024-08-07 PROCEDURE — 83036 HEMOGLOBIN GLYCOSYLATED A1C: CPT

## 2024-08-09 DIAGNOSIS — R79.89 ELEVATED SERUM CREATININE: Primary | ICD-10-CM

## 2024-08-12 ENCOUNTER — LAB (OUTPATIENT)
Dept: LAB | Facility: LAB | Age: 59
End: 2024-08-12
Payer: COMMERCIAL

## 2024-08-12 DIAGNOSIS — R79.89 ELEVATED SERUM CREATININE: ICD-10-CM

## 2024-08-12 LAB
ANION GAP SERPL CALC-SCNC: 7 MMOL/L (ref 10–20)
BUN SERPL-MCNC: 23 MG/DL (ref 6–23)
CALCIUM SERPL-MCNC: 9.6 MG/DL (ref 8.6–10.3)
CHLORIDE SERPL-SCNC: 102 MMOL/L (ref 98–107)
CO2 SERPL-SCNC: 32 MMOL/L (ref 21–32)
CREAT SERPL-MCNC: 1.01 MG/DL (ref 0.5–1.05)
EGFRCR SERPLBLD CKD-EPI 2021: 65 ML/MIN/1.73M*2
GLUCOSE SERPL-MCNC: 75 MG/DL (ref 74–99)
POTASSIUM SERPL-SCNC: 4 MMOL/L (ref 3.5–5.3)
SODIUM SERPL-SCNC: 137 MMOL/L (ref 136–145)

## 2024-08-12 PROCEDURE — 80048 BASIC METABOLIC PNL TOTAL CA: CPT

## 2024-08-12 PROCEDURE — 36415 COLL VENOUS BLD VENIPUNCTURE: CPT

## 2024-08-16 ENCOUNTER — APPOINTMENT (OUTPATIENT)
Dept: PRIMARY CARE | Facility: CLINIC | Age: 59
End: 2024-08-16
Payer: COMMERCIAL

## 2024-08-16 VITALS
WEIGHT: 106.2 LBS | SYSTOLIC BLOOD PRESSURE: 102 MMHG | HEIGHT: 66 IN | BODY MASS INDEX: 17.07 KG/M2 | DIASTOLIC BLOOD PRESSURE: 68 MMHG

## 2024-08-16 DIAGNOSIS — D69.6 THROMBOCYTOPENIA (CMS-HCC): ICD-10-CM

## 2024-08-16 DIAGNOSIS — R79.89 ELEVATED SERUM CREATININE: Primary | ICD-10-CM

## 2024-08-16 DIAGNOSIS — E53.8 B12 DEFICIENCY: ICD-10-CM

## 2024-08-16 RX ORDER — ESTRADIOL 10 UG/1
10 INSERT VAGINAL 2 TIMES WEEKLY
COMMUNITY

## 2024-08-16 ASSESSMENT — PATIENT HEALTH QUESTIONNAIRE - PHQ9
SUM OF ALL RESPONSES TO PHQ9 QUESTIONS 1 AND 2: 0
1. LITTLE INTEREST OR PLEASURE IN DOING THINGS: NOT AT ALL
2. FEELING DOWN, DEPRESSED OR HOPELESS: NOT AT ALL

## 2024-08-16 NOTE — PROGRESS NOTES
"SUBJECTIVE:   Namrata Myers is a 59 y.o. female presenting for her annual checkup.  Current Outpatient Medications   Medication Sig Dispense Refill    buPROPion XL (Wellbutrin XL) 150 mg 24 hr tablet Take 1 tablet (150 mg) by mouth once daily. Do not crush, chew, or split.      cetirizine (ZyrTEC) 10 mg capsule Take by mouth once daily.      cholecalciferol (Vitamin D-3) 50 mcg (2,000 unit) capsule Take by mouth early in the morning..      cyanocobalamin (Vitamin B-12) 1,000 mcg tablet Take 100 mcg by mouth once daily.      DULoxetine (Cymbalta) 30 mg DR capsule Take 3 capsules (90 mg) by mouth once daily. 270 capsule 3    estradiol (Vagifem) 10 mcg tablet vaginal tablet Insert 1 tablet (10 mcg) into the vagina 2 times a week.      magnesium oxide 400 mg magnesium capsule Take 1 capsule (400 mg) by mouth once daily.      SUMAtriptan (Imitrex) 100 mg tablet Take 1 tablet (100 mg) by mouth 1 time if needed for migraine. 9 tablet 3     No current facility-administered medications for this visit.     Allergies: Codeine and Pollen extracts     ROS:  Feeling well. No dyspnea or chest pain on exertion. No abdominal pain, change in bowel habits, black or bloody stools. No urinary tract  symptoms. No neurological complaints.    OBJECTIVE:   The patient appears well, alert, oriented x 3, in no distress.   /68   Ht 1.683 m (5' 6.25\")   Wt 48.2 kg (106 lb 3.2 oz)   BMI 17.01 kg/m²   ENT normal.  Neck supple. No adenopathy or thyromegaly. LUIZ. Lungs are clear, good air entry, no wheezes, rhonchi or rales. S1 and S2 normal, no murmurs, regular rate and rhythm. Abdomen is soft without tenderness, guarding, mass or organomegaly.  Extremities show no edema, normal peripheral pulses. Neurological is normal without focal findings.      Lab Results   Component Value Date    WBC 6.1 08/07/2024    HGB 12.9 08/07/2024    HCT 40.5 08/07/2024     08/07/2024    CHOL 200 (H) 08/07/2024    TRIG 142 08/07/2024    HDL 67.0 " 08/07/2024    ALT 15 08/07/2024    AST 23 05/28/2019     08/12/2024    K 4.0 08/12/2024     08/12/2024    CREATININE 1.01 08/12/2024    BUN 23 08/12/2024    CO2 32 08/12/2024    TSH 3.36 08/07/2024    INR 1.0 07/19/2022    HGBA1C 5.7 (H) 08/07/2024     Lab Results   Component Value Date    [id number] (H) 03/28/2024       ASSESSMENT:   healthy adult female    PLAN:   continue current medications and continue current healthy lifestyle patterns     #1 anxiety/depression- reviewed, controlled.  Continue treatment.  #2 fatigue-low-grade.  better  #3 b12-con't  B12,  1000 mcg OTC daily.  #4 vit D-Labs  #5 thrombocytopenia-clinically stable.  Recheck CBC every 6 months.  Status post heme evaluation  #6 kidney stones/hematuria- good.  f/u  w/   #7  ifbs- +fhx.  Reviewed.  Discussed a reduced sugar/carbohydrate diet with continued daily exercise.  Follow-up 6 months for retest.

## 2024-10-11 ENCOUNTER — LAB (OUTPATIENT)
Dept: LAB | Facility: LAB | Age: 59
End: 2024-10-11
Payer: COMMERCIAL

## 2024-10-11 DIAGNOSIS — D69.6 THROMBOCYTOPENIA (CMS-HCC): ICD-10-CM

## 2024-10-11 DIAGNOSIS — R79.89 ELEVATED SERUM CREATININE: ICD-10-CM

## 2024-10-11 LAB
ANION GAP SERPL CALC-SCNC: 9 MMOL/L (ref 10–20)
BUN SERPL-MCNC: 23 MG/DL (ref 6–23)
CALCIUM SERPL-MCNC: 9.2 MG/DL (ref 8.6–10.3)
CHLORIDE SERPL-SCNC: 102 MMOL/L (ref 98–107)
CO2 SERPL-SCNC: 31 MMOL/L (ref 21–32)
CREAT SERPL-MCNC: 0.98 MG/DL (ref 0.5–1.05)
EGFRCR SERPLBLD CKD-EPI 2021: 67 ML/MIN/1.73M*2
ERYTHROCYTE [DISTWIDTH] IN BLOOD BY AUTOMATED COUNT: 12.4 % (ref 11.5–14.5)
GLUCOSE SERPL-MCNC: 98 MG/DL (ref 74–99)
HCT VFR BLD AUTO: 39.6 % (ref 36–46)
HGB BLD-MCNC: 12.8 G/DL (ref 12–16)
MCH RBC QN AUTO: 29.6 PG (ref 26–34)
MCHC RBC AUTO-ENTMCNC: 32.3 G/DL (ref 32–36)
MCV RBC AUTO: 92 FL (ref 80–100)
NRBC BLD-RTO: 0 /100 WBCS (ref 0–0)
PLATELET # BLD AUTO: 259 X10*3/UL (ref 150–450)
POTASSIUM SERPL-SCNC: 4.1 MMOL/L (ref 3.5–5.3)
RBC # BLD AUTO: 4.33 X10*6/UL (ref 4–5.2)
SODIUM SERPL-SCNC: 138 MMOL/L (ref 136–145)
WBC # BLD AUTO: 7 X10*3/UL (ref 4.4–11.3)

## 2024-10-11 PROCEDURE — 36415 COLL VENOUS BLD VENIPUNCTURE: CPT

## 2024-10-11 PROCEDURE — 80048 BASIC METABOLIC PNL TOTAL CA: CPT

## 2024-10-11 PROCEDURE — 85027 COMPLETE CBC AUTOMATED: CPT

## 2024-11-11 DIAGNOSIS — F41.9 ANXIETY: ICD-10-CM

## 2024-11-11 RX ORDER — DULOXETIN HYDROCHLORIDE 30 MG/1
CAPSULE, DELAYED RELEASE ORAL
Qty: 270 CAPSULE | Refills: 3 | Status: SHIPPED | OUTPATIENT
Start: 2024-11-11

## 2025-04-23 ENCOUNTER — HOSPITAL ENCOUNTER (OUTPATIENT)
Dept: RADIOLOGY | Facility: CLINIC | Age: 60
Discharge: HOME | End: 2025-04-23
Payer: COMMERCIAL

## 2025-04-23 DIAGNOSIS — N20.0 NEPHROLITHIASIS: ICD-10-CM

## 2025-04-23 PROCEDURE — 74018 RADEX ABDOMEN 1 VIEW: CPT

## 2025-04-23 PROCEDURE — 74018 RADEX ABDOMEN 1 VIEW: CPT | Performed by: RADIOLOGY

## 2025-05-06 ENCOUNTER — APPOINTMENT (OUTPATIENT)
Dept: UROLOGY | Facility: CLINIC | Age: 60
End: 2025-05-06
Payer: COMMERCIAL

## 2025-05-06 DIAGNOSIS — R31.21 ASYMPTOMATIC MICROSCOPIC HEMATURIA: ICD-10-CM

## 2025-05-06 DIAGNOSIS — N20.0 NEPHROLITHIASIS: Primary | ICD-10-CM

## 2025-05-06 PROCEDURE — 99212 OFFICE O/P EST SF 10 MIN: CPT | Performed by: STUDENT IN AN ORGANIZED HEALTH CARE EDUCATION/TRAINING PROGRAM

## 2025-05-06 NOTE — PROGRESS NOTES
Scribed for Dr. Jeet Newman by Shawn Waller. I, Dr. Jeet Newman have personally reviewed and agreed with the information entered by the Virtual Scribe. This visit was completed via telemedicine. All issues as below were discussed and addressed but no physical exam was performed unless allowed by visual confirmation. If it was felt that the patient should be evaluated in clinic, then they were directed there. Patient verbal consented to the visit. 05/06/25.    ASSESSMENT:  Problem List Items Addressed This Visit       Nephrolithiasis - Primary    Relevant Orders    XR abdomen 1 view    Asymptomatic microscopic hematuria        PLAN:  #Nephrolithiasis  KUB (08/02/23) ~ 3 mm right intrarenal stone fragment  KUB (03/29/24) ~ No right sided stones visualized; small 3-4 mm LLP stone.   KUB (04/23/25) ~ Non-obstructing RLP stone; no visible left sided stones.   Stones difficult to discern on KUB's 2/2 small size and bowel gas pattern.   Overall, no discernable change in overall stone burden.     She remains asymptomatic.   Elects to continue stone surveillance for now.   RTC in 1 year for reassessment with a KUB prior to.     Discussion:  Patient was educated on stone prevention dietary modifications which include increased water intake, citric acid supplementation in the form of lemon juice, low oxalate diet, moderate protein intake and low sodium intake. In addition, suggested avoiding dark-colored sodas. A daily urine output of 2.5 L is also recommended if feasible.     All questions were answered to the patient’s satisfaction.  Patient agrees with the plan and wishes to proceed.  Continue follow-up for ongoing care of her chronic medical conditions.       History of Present Illness (HPI):  Namrata presents virtually for a follow up visit.  The patient’s EMR has been reviewed.  Lives in [place], [place].    Hx of microscopic hematuria, and BL nephrolithiasis.   Previous hematuria work-up negative x2. (2019, 2022)  Cytology  each time showed atypical cells only.     For her bilateral nephrolithiasis.   Previously had a 7 mm right sided stone.   Underwent RIGHT ESWL with me for this. (08/05/22)  Had a good response, stones fragmented well.     KUB (08/02/23) ~ 3 mm right intrarenal stone fragment  KUB (03/29/24) ~ No right sided stones visualized; small 3-4 mm LLP stone.   KUB (04/23/25) ~ Non-obstructing RLP stone; no visible left sided stones.   However stones difficult to discern on KUB's 2/2 bowel gas pattern.   Overall, no significant interval change in stone burden.     TODAY: (05/06/25)  Presents virtually for annual follow up and KUB results.   Reports she has been doing well overall.   Denies any stone-related symptoms.   Denies any recent infections, fevers or chills.     KUB (04/23/25) personally reviewed and independently interpreted.  Non-obstructing RLP renal calculi; Evaluation for nephroliths is limited due to projection of bowel gas over the shadows of the kidneys. Density overlying the R lower abdomen appears to be non-obstructing bowel gas pattern.    No radiopaque densities projecting over the expected shadows of the left kidney or expected courses of the bilateral ureters.    TO REVIEW: [05/06/24]  Presents virtually for follow up and KUB results.   Reports she has been doing well overall.   Denies any stone-related symptoms.   Denies any recent infections, fevers or chills.   Latest KUB results reviewed with patient.     KUB (03/29/24) personally reviewed.   Showed a small 3-4 mm LLP stone.   No stones visualized within the right kidney.     TO REVIEW: Last visit (11/06/23)  Presents today for annual FU and latest KUB results.  Personally reviewed her latest KUB (08/07/23).   Showed no discernible stone BL.   Findings shared with patient.   IO urinalysis negative for blood.   Continues to deny any stone-related symptoms.   Denies any recent UTIs, gross hematuria, flank pain, fevers or chills.     KUB (08/02/23) RLP  stone fragment much improved;  - from 7mm to 3mm s/p ESWL  - No evident stones on the left, but with overlying bowel present.     TO REVIEW: (09/15/22)  Pre-op KUB results showed:  - 7 mm stone of RLP (RIGHT)  - 2x small stones on the LEFT, 3mm and 1-2mm seen.     Now s/p R ESWL 8/5/22     Post-op KUB personally reviewed:   - No obvious right renal stones noted  - Left sided stones are difficult to see on this study, were punctate      TO REVIEW:  CTU personally reviewed: she has 6-7mm lower pole stones.  Slightly increased in size; no hydronephrosis;  No filling defect, no renal tumors.     PMH/PSH/Family/Social history all reviewed and unchanged  She is a   Nonsmoker  No family history of  malignancy    Past Medical History:   Diagnosis Date    Allergic     Headache      Past Surgical History:   Procedure Laterality Date    RHINOPLASTY  07/14/2015    Rhinoplasty     Family History   Problem Relation Name Age of Onset    Dementia Mother      Marfan syndrome Father      Aortic aneurysm Father      Hypertension Father      Diabetes Father      Colon cancer Paternal Grandfather       Social History     Tobacco Use   Smoking Status Never   Smokeless Tobacco Never     Current Outpatient Medications   Medication Sig Dispense Refill    buPROPion XL (Wellbutrin XL) 150 mg 24 hr tablet Take 1 tablet (150 mg) by mouth once daily. Do not crush, chew, or split.      cetirizine (ZyrTEC) 10 mg capsule Take by mouth once daily.      cholecalciferol (Vitamin D-3) 50 mcg (2,000 unit) capsule Take by mouth early in the morning..      cyanocobalamin (Vitamin B-12) 1,000 mcg tablet Take 100 mcg by mouth once daily.      DULoxetine (Cymbalta) 30 mg DR capsule TAKE 3 CAPSULES ONCE DAILY 270 capsule 3    estradiol (Vagifem) 10 mcg tablet vaginal tablet Insert 1 tablet (10 mcg) into the vagina 2 times a week.      magnesium oxide 400 mg magnesium capsule Take 1 capsule (400 mg) by mouth once daily.       SUMAtriptan (Imitrex) 100 mg tablet Take 1 tablet (100 mg) by mouth 1 time if needed for migraine. 9 tablet 3     No current facility-administered medications for this visit.     Allergies   Allergen Reactions    Codeine Diarrhea, Nausea And Vomiting, Nausea Only, Other and Rash     nausea    Pollen Extracts Hives     Sneezing, sinus, watery eyes     Past medical, surgical, family and social history in the chart was reviewed and is accurate including any additions to what is in this HPI.    REVIEW OF SYSTEMS (ROS):   Constitutional: denies any unintentional weight loss or change in strength.  Integumentary: denies any rashes or pruritus.  Eyes: denies any double vision or eye pain.  Ear/Nose/Mouth/Throat: denies any nosebleeds or gum bleeds.  Cardiovascular: denies any chest pain or syncope.  Respiratory: denies hemoptysis.  Gastrointestinal: denies nausea or vomiting.  Musculoskeletal: denies muscle cramping or weakness.  Neurologic: denies convulsions or seizures.  Hematologic/Lymphatic: denies bleeding tendencies.  Endocrine: denies heat/cold intolerance.  All other systems have been reviewed and are negative unless otherwise noted in the HPI.     OBJECTIVE:  There were no vitals taken for this visit.  PHYSICAL EXAM:  Constitutional: No obvious distress.  Eyes: Non-injected conjunctiva, sclera clear, EOMI.  Ears/Nose/Mouth/Throat: No obvious drainage per ears or nose.  Cardiovascular: Extremities are warm and well perfused. No edema, cyanosis or pallor.  Respiratory: No audible wheezing/stridor; respirations do not appear labored.  Gastrointestinal: Abdomen soft, not distended.  Musculoskeletal: Normal ROM of extremities.  Skin: No obvious rashes or open sores.  Neurologic: Alert and oriented, CN 2-12 grossly intact.  Psychiatric: Answers questions appropriately with normal affect.  Hematologic/Lymphatic/Immunologic: No obvious bruises or sites of spontaneous bleeding.  Genitourinary: No CVA tenderness, bladder  not palpable.     LABS & IMAGING:  Lab Results   Component Value Date    WBC 7.0 10/11/2024    HGB 12.8 10/11/2024    HCT 39.6 10/11/2024     10/11/2024    CHOL 200 (H) 08/07/2024    TRIG 142 08/07/2024    HDL 67.0 08/07/2024    ALT 15 08/07/2024    AST 23 05/28/2019     10/11/2024    K 4.1 10/11/2024     10/11/2024    CREATININE 0.98 10/11/2024    BUN 23 10/11/2024    CO2 31 10/11/2024    TSH 3.36 08/07/2024    INR 1.0 07/19/2022    HGBA1C 5.7 (H) 08/07/2024     Scribed for Dr. Jeet Newman by Shawn Waller.  I, Dr. Jeet Newman have personally reviewed and agreed with the information entered by the Virtual Scribe. 05/06/25.